# Patient Record
Sex: MALE
[De-identification: names, ages, dates, MRNs, and addresses within clinical notes are randomized per-mention and may not be internally consistent; named-entity substitution may affect disease eponyms.]

---

## 2020-07-16 PROBLEM — Z00.00 ENCOUNTER FOR PREVENTIVE HEALTH EXAMINATION: Status: ACTIVE | Noted: 2020-07-16

## 2023-08-29 ENCOUNTER — NON-APPOINTMENT (OUTPATIENT)
Age: 78
End: 2023-08-29

## 2023-08-29 ENCOUNTER — APPOINTMENT (OUTPATIENT)
Dept: UROLOGY | Facility: CLINIC | Age: 78
End: 2023-08-29
Payer: COMMERCIAL

## 2023-08-29 ENCOUNTER — RESULT CHARGE (OUTPATIENT)
Age: 78
End: 2023-08-29

## 2023-08-29 VITALS
SYSTOLIC BLOOD PRESSURE: 135 MMHG | BODY MASS INDEX: 24.57 KG/M2 | OXYGEN SATURATION: 98 % | HEART RATE: 73 BPM | DIASTOLIC BLOOD PRESSURE: 79 MMHG | HEIGHT: 67 IN | WEIGHT: 156.53 LBS | TEMPERATURE: 97.5 F

## 2023-08-29 DIAGNOSIS — R33.9 RETENTION OF URINE, UNSPECIFIED: ICD-10-CM

## 2023-08-29 DIAGNOSIS — R39.9 UNSPECIFIED SYMPTOMS AND SIGNS INVOLVING THE GENITOURINARY SYSTEM: ICD-10-CM

## 2023-08-29 DIAGNOSIS — Z78.9 OTHER SPECIFIED HEALTH STATUS: ICD-10-CM

## 2023-08-29 DIAGNOSIS — Z83.3 FAMILY HISTORY OF DIABETES MELLITUS: ICD-10-CM

## 2023-08-29 DIAGNOSIS — Z83.1 FAMILY HISTORY OF OTHER INFECTIOUS AND PARASITIC DISEASES: ICD-10-CM

## 2023-08-29 DIAGNOSIS — R97.20 ELEVATED PROSTATE, SPECIFIC ANTIGEN [PSA]: ICD-10-CM

## 2023-08-29 DIAGNOSIS — I10 ESSENTIAL (PRIMARY) HYPERTENSION: ICD-10-CM

## 2023-08-29 DIAGNOSIS — E78.5 HYPERLIPIDEMIA, UNSPECIFIED: ICD-10-CM

## 2023-08-29 LAB
BILIRUB UR QL STRIP: NORMAL
CLARITY UR: CLEAR
COLLECTION METHOD: NORMAL
GLUCOSE UR-MCNC: NORMAL
HCG UR QL: 0.2 EU/DL
HGB UR QL STRIP.AUTO: NORMAL
KETONES UR-MCNC: NORMAL
LEUKOCYTE ESTERASE UR QL STRIP: NORMAL
NITRITE UR QL STRIP: NORMAL
PH UR STRIP: 6.5
PROT UR STRIP-MCNC: NORMAL
SP GR UR STRIP: 1.01

## 2023-08-29 PROCEDURE — 99205 OFFICE O/P NEW HI 60 MIN: CPT

## 2023-08-29 RX ORDER — ASPIRIN 81 MG
81 TABLET, DELAYED RELEASE (ENTERIC COATED) ORAL
Refills: 0 | Status: ACTIVE | COMMUNITY

## 2023-08-29 RX ORDER — ROSUVASTATIN CALCIUM 5 MG/1
TABLET, FILM COATED ORAL
Refills: 0 | Status: ACTIVE | COMMUNITY

## 2023-08-29 RX ORDER — ALFUZOSIN HYDROCHLORIDE 10 MG/1
10 TABLET, EXTENDED RELEASE ORAL
Qty: 90 | Refills: 3 | Status: ACTIVE | COMMUNITY
Start: 2023-08-29 | End: 1900-01-01

## 2023-08-29 RX ORDER — LOSARTAN POTASSIUM 100 MG/1
TABLET, FILM COATED ORAL
Refills: 0 | Status: ACTIVE | COMMUNITY

## 2023-08-29 RX ORDER — FELODIPINE 2.5 MG
TABLET, EXTENDED RELEASE 24 HR ORAL
Refills: 0 | Status: ACTIVE | COMMUNITY

## 2023-08-29 RX ORDER — DUTASTERIDE 0.5 MG/1
0.5 CAPSULE, LIQUID FILLED ORAL
Refills: 0 | Status: DISCONTINUED | COMMUNITY
End: 2023-08-29

## 2023-08-29 RX ORDER — DUTASTERIDE 0.5 MG/1
0.5 CAPSULE, LIQUID FILLED ORAL
Qty: 90 | Refills: 3 | Status: ACTIVE | COMMUNITY
Start: 2023-08-29 | End: 1900-01-01

## 2023-08-29 NOTE — ASSESSMENT
[FreeTextEntry1] : I discussed the findings and options with Dr. JUNE CID in detail and reviewed the available prior records.  Because of the significant degree of urinary retention and his very large prostate, I recommended that he restart the combination therapy that he was previously on (alfuzosin and dutasteride).  Regarding the chronic PSA elevation, providing that the recent prostate MRI did not indicate a high PI-RADS score I do not feel that any further intervention is warranted at this time.  I have asked Dr. Cid to repeat a postvoid bladder sonogram in 6 months to assess the effect of the aforementioned treatment.  At that time a PSA can be repeated as well.  In the meantime, he will send us the prostate MRI report for my review.

## 2023-08-29 NOTE — PHYSICAL EXAM
[General Appearance - Well Developed] : well developed [General Appearance - Well Nourished] : well nourished [Well Groomed] : well groomed [Normal Appearance] : normal appearance [General Appearance - In No Acute Distress] : no acute distress [Edema] : no peripheral edema [Exaggerated Use Of Accessory Muscles For Inspiration] : no accessory muscle use [Respiration, Rhythm And Depth] : normal respiratory rhythm and effort [Abdomen Soft] : soft [Abdomen Tenderness] : non-tender [Urethral Meatus] : meatus normal [Costovertebral Angle Tenderness] : no ~M costovertebral angle tenderness [Urinary Bladder Findings] : the bladder was normal on palpation [Scrotum] : the scrotum was normal [Testes Mass (___cm)] : there were no testicular masses [No Prostate Nodules] : no prostate nodules [Normal Station and Gait] : the gait and station were normal for the patient's age [] : no rash [Oriented To Time, Place, And Person] : oriented to person, place, and time [Affect] : the affect was normal [Mood] : the mood was normal [Not Anxious] : not anxious [Abdomen Mass (___ Cm)] : no abdominal mass palpated [Abdomen Hernia] : no hernia was discovered [Penis Abnormality] : normal uncircumcised penis [Epididymis] : the epididymides were normal [Testes Tenderness] : no tenderness of the testes [Prostate Tenderness] : the prostate was not tender

## 2023-08-29 NOTE — HISTORY OF PRESENT ILLNESS
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   He has a longstanding history of PSA elevations (see below). A prostate biopsy was performed in July 2009, which was negative. His most recent PSA was "11," (March 2023). He also reportedly recently had a prostate MRI which was unremarkable (report unavailable).  From his general urologic history, Dr. Cid reports moderate chronic lower urinary tract symptoms, and nocturia x 2. He had previously been on Avodart and Uroxatrol, which he discontinued for reasons which are not clear. IPSS: 11/2 Sono (performed to assess bladder emptying): PVR #2 375 cc, 225 cc prostate, median lobe 2.7 cm)  In 2009, he went into acute urinary retention, managed with short-term clean intermittent catheterization.  Dr. Cid reports normal erectile function.  He denies a family history of prostate cancer.   PSA: 03/2023--"1l"; 07/2020--14.7; 04/2019--2.3; 01/2018--"2.6"; 05/2014--"2.9"; 05/2013--"3.3"; 04/2012--"3.3"; 09/2010--4.2; 09/2009--5.1; 05/2009--20.28 (Avodart started in April 2009);12/2007--11.3; 09/2006--11.61  Prostate MRI: 03/2023--"unremarkable"  Prostate biopsy: 7/3/09--BPH  TRUS: 5/25/09--57 cc prostate; 4/22/09--143 cc prostate

## 2023-08-30 LAB — PSA SERPL-MCNC: 15.7 NG/ML

## 2023-08-31 LAB — BACTERIA UR CULT: NORMAL

## 2023-09-12 ENCOUNTER — NON-APPOINTMENT (OUTPATIENT)
Age: 78
End: 2023-09-12

## 2023-10-12 ENCOUNTER — NON-APPOINTMENT (OUTPATIENT)
Age: 78
End: 2023-10-12

## 2024-06-02 ENCOUNTER — NON-APPOINTMENT (OUTPATIENT)
Age: 79
End: 2024-06-02